# Patient Record
(demographics unavailable — no encounter records)

---

## 2018-04-18 NOTE — ULT
SCROTAL ULTRASOUND:

4/18/18

 

INDICATION:

Testicular pain more on the left than the right. 

 

TECHNIQUE:  

Gray scale, color doppler and vascular duplex with spectral analysis was performed of the scrotum. 

 

FINDINGS:  

There is thickening of the scrotal wall with increased vascularity suspicious for changes of scrotal 
cellulitis.

 

The right testicle measures 3.0 x 1.7 x 2.5 cm. Left testicle measures 2.3 x 1.9 x 2.4 cm. There is n
ormal flow seen within both testicles.

 

The epididymis bilaterally appears enlarged and heterogeneous in appearance with increased vascular f
low. 

 

There are bilateral hydroceles. There is prominent varicoceles seen within the left inguinal region a
s well as the right inguinal region. 

 

IMPRESSION:  

1.      Large heterogeneous appearing epididymi with increased vascular flow suspicious for changes o
f epididymitis with bilateral hydroceles. Recommend correlation.

2.      Bilateral varicoceles. 

3.      Some thickening of the scrotal wall with increased vascularity may reflect a component of scr
otal cellulitis. Recommend correlation. 

 

POS: KAYLYNN